# Patient Record
(demographics unavailable — no encounter records)

---

## 2025-01-08 NOTE — HEALTH RISK ASSESSMENT
[No] : In the past 12 months have you used drugs other than those required for medical reasons? No [0] : 2) Feeling down, depressed, or hopeless: Not at all (0) [PHQ-2 Negative - No further assessment needed] : PHQ-2 Negative - No further assessment needed [[id number]] : 0 [Never] : Never [NO] : No [HIV test declined] : HIV test declined [Hepatitis C test declined] : Hepatitis C test declined

## 2025-01-08 NOTE — HISTORY OF PRESENT ILLNESS
[FreeTextEntry1] : physical exam.  [de-identified] : Ms. MALLORY HARRINGTON is a 42 year female with a PMH of PCOS comes to the office for physical exam.

## 2025-01-08 NOTE — PLAN
[FreeTextEntry1] : In regards to patients Physical exam, routine blood work drawn, will review results with patient. EKG reviewed in office  Allergic rhinitis- Patient prescribed Intranasal steroid and Oral antihistamine.  Patient referred to ENT  PCOS- patient will continue medication as prescribed by OB/GYN   Rash of right lower leg- patient will follow up with Dermatologist Dr. Sancho Stock  Counseling included abnormal lab results, differential diagnoses, treatment options, risks and benefits, lifestyle changes, current condition, medications, and dose adjustments.  The patient was interactive, attentive, asked questions, and verbalized understanding

## 2025-01-31 NOTE — PLAN
[TextEntry] : I recommend she continue astelin and begin irrigations and flonase in addition.   We will obtain CT sinus. I will call with results.

## 2025-01-31 NOTE — HISTORY OF PRESENT ILLNESS
[de-identified] : 42 year old female here for rhinitis for 7 years with sinus congestion, sinus pain/pressure, headache and anosmia for the past week. Pt reports anterior rhinorrhea when sneezing, denies recent CT scan of sinuses. Prescribed astelin without much benefit, but not using regularly. Has rhinitis of pregnancy but seems to be congested still. Never been allergy tested. Right more congested. Hyposmia. Breathes through nose, but at times needs mouth.

## 2025-01-31 NOTE — PROCEDURE
[de-identified] : travis [de-identified] : Procedure: Bilateral nasal endoscopy (CPT 58836)   Indication: Anterior rhinoscopy was inadequate to evaluate pathology.  Left: Septum left MM with edema and thick clear mucus Sphenoethmoidal recess clear, without masses, polyps, or pus  Right:  Moderate inferior turbinate hypertrophy MM clear SER with edema